# Patient Record
Sex: MALE
[De-identification: names, ages, dates, MRNs, and addresses within clinical notes are randomized per-mention and may not be internally consistent; named-entity substitution may affect disease eponyms.]

---

## 2019-11-20 ENCOUNTER — HOSPITAL ENCOUNTER (EMERGENCY)
Dept: HOSPITAL 5 - ED | Age: 44
Discharge: HOME | End: 2019-11-20
Payer: COMMERCIAL

## 2019-11-20 VITALS — DIASTOLIC BLOOD PRESSURE: 85 MMHG | SYSTOLIC BLOOD PRESSURE: 134 MMHG
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
## 2019-11-20 DIAGNOSIS — S06.0X0A: Primary | ICD-10-CM
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
## 2019-11-20 PROCEDURE — 70450 CT HEAD/BRAIN W/O DYE: CPT

## 2019-11-20 NOTE — EMERGENCY DEPARTMENT REPORT
ED General Adult HPI





- General


Chief complaint: Head Injury


Stated complaint: HEAD INJURY


Time Seen by Provider: 11/20/19 18:45


Source: patient, RN notes reviewed


Mode of arrival: Ambulatory


Limitations: No Limitations





- History of Present Illness


Initial comments: 





This is a pleasant 44-year-old gentleman.  This patient is not known to this 

provider previously.  The patient lives in [place].  He does not have a local 

primary care doctor.  The patient was in his usual state of health 3 days ago, 

when he slipped and had a mechanical fall, mostly landing on the superior aspect

of his back, but also hitting his head.  Prior to the fall, he was not having 

any medical complaints or symptoms.  After the fall, he developed a mild 

headache.  He reports that since then, he is felt unwell, mild headache, and 

dizzy.  He is not lost consciousness.  He denies vomiting, diaphoresis.  He 

denies midline neck pain.  He denies chest pain and abdominal pain and shortness

of breath.  He describes his dizziness as a sensation of lightheadedness.  There

is no sensation of heart racing and/or palpitations.  He makes no complaints or 

admission of DVT and/or pulmonary embolism risk factors.


-: Sudden


Location: head


Quality: aching


Consistency: intermittent


Improves with: none


Worsens with: none





- Related Data


                                    Allergies











Allergy/AdvReac Type Severity Reaction Status Date / Time


 


No Known Allergies Allergy   Unverified 11/20/19 17:01














ED Review of Systems


ROS: 


Stated complaint: HEAD INJURY


Other details as noted in HPI





Constitutional: denies: fever


Eyes: denies: eye discharge


ENT: denies: congestion


Cardiovascular: denies: syncope


Gastrointestinal: denies: abdominal pain, vomiting


Musculoskeletal: myalgia


Neurological: weakness.  denies: abnormal gait, vertigo


Hematological/Lymphatic: denies: easy bleeding





ED Past Medical Hx





- Past Medical History


Previous Medical History?: No





- Surgical History


Past Surgical History?: No





ED Physical Exam





- General


Limitations: No Limitations


General appearance: alert, in no apparent distress





- Head


Head exam: Present: atraumatic, normocephalic





- Eye


Eye exam: Present: normal appearance, PERRL, EOMI, other (visual acuity intact 

to finger counting, color perception, reading at a close distance).  Absent: 

nystagmus





- ENT


ENT exam: Present: normal exam, normal orophraynx, mucous membranes moist, 

normal external ear exam (visual acuity intact to finger counting, color 

perception, reading at a close distance)





- Neck


Neck exam: Present: normal inspection, full ROM.  Absent: tenderness, 

meningismus





- Respiratory


Respiratory exam: Present: normal lung sounds bilaterally.  Absent: respiratory 

distress





- Cardiovascular


Cardiovascular Exam: Present: regular rate, normal rhythm, normal heart sounds. 

Absent: bradycardia, tachycardia, irregular rhythm, systolic murmur, diastolic 

murmur, rubs, gallop





- GI/Abdominal


GI/Abdominal exam: Present: soft, normal bowel sounds.  Absent: distended, 

tenderness, guarding, rebound, rigid, pulsatile mass





- Rectal


Rectal exam: Present: deferred





- Extremities Exam


Extremities exam: Present: normal inspection, full ROM, other (2+ pulses noted 

in the bilateral upper, lower extremities.  There is no long bone tenderness.  

Musculoskeletal compartments are soft.  The pelvis is stable.).  Absent: pedal 

edema, joint swelling, calf tenderness





- Back Exam


Back exam: Present: normal inspection, full ROM.  Absent: tenderness, CVA 

tenderness (R), CVA tenderness (L), paraspinal tenderness, vertebral tenderness





- Neurological Exam


Neurological exam: Present: alert (there is no past-pointing.  There is normal 

heel-to-shin.  There is no pronator drift.  There is normal gait.  There is 

normal tandem gait.  There is negative Romberg examination.), oriented X3 (the 

patient is able to add, multiply, recall 3/3 words at 0, and 5 minutes), normal

gait, other (there is no facial droop.  The tongue is midline.  Extraocular 

movements are intact bilaterally.  Patient speaking in full complete sentences. 

Shoulder shrug is intact bilaterally.  Hearing is grossly intact bilaterally.  

Visual acuity intact to finger counting and color perception at a close 

distance.  5/5 strength 4 extremities.  Sensation intact to light touch in 4 

extremities.).  Absent: motor sensory deficit





- Psychiatric


Psychiatric exam: Present: normal affect, normal mood





- Skin


Skin exam: Present: warm, dry, intact, normal color.  Absent: rash





ED Course


                                   Vital Signs











  11/20/19





  18:54


 


Temperature 98.4 F


 


Pulse Rate 74


 


Respiratory 18





Rate 


 


Blood Pressure 134/85


 


O2 Sat by Pulse 98





Oximetry 














ED Medical Decision Making





- Lab Data








                                   Vital Signs











  11/20/19





  18:54


 


Temperature 98.4 F


 


Pulse Rate 74


 


Respiratory 18





Rate 


 


Blood Pressure 134/85


 


O2 Sat by Pulse 98





Oximetry 














- Radiology Data


Radiology results: report reviewed, image reviewed





Noncontrast CT scan of the brain is negative for acute disease





- Medical Decision Making





Differential diagnosis, including but not limited to: Concussion, mild 

intracranial injury





Assessment and plan: 44-year-old gentleman who is approximately 3 days status 

post mild blunt head injury.  He is afebrile with reassuring vital signs and 

clinically sober.  He walks with a steady gait.  GCS of 15, NIH score of 0.

Patient is clinically sober at this time.  The cervical spine is cleared through

nexus and Guyanese c spine rule











Patient most likely experiencing natural history of concussion.  We discussed 

expectant management for concussion.  CT scan brain negative for acute disease. 

He appears quite comfortable at this time, he is medically suitable for 

discharge with an outpatient physician.


Critical care attestation.: 


If time is entered above; I have spent that time in minutes in the direct care 

of this critically ill patient, excluding procedure time.








ED Disposition


Clinical Impression: 


Concussion


Qualifiers:


 Encounter type: initial encounter Loss of consciousness presence/duration: 

without LOC Qualified Code(s): S06.0X0A - Concussion without loss of 

consciousness, initial encounter





Disposition: DC-01 TO HOME OR SELFCARE


Is pt being admited?: No


Does the pt Need Aspirin: No


Condition: Stable


Instructions:  Minor Head Injury (ED)


Additional Instructions: 


The patient does not have an immediate medical prohibition to returning to work 

at this time.  Patient likely has a concussion, and may experience sensitivity 

to light, sensitivity to sounds.  The patient may return to light duty, however,

he should not participate in strenuous physical activity and or contact sports 

or athletics until cleared to do so by a primary care doctor.  Patient may 

alternate over-the-counter Motrin and Tylenol as needed for pain.  Advance diet 

as tolerated.  Patient may benefit from caffeinated beverages.  Follow-up with 

the primary care doctor within the next 2 weeks.  Patient may go to sleep 

without sleep restriction.  Return to emergency room right away with new, worsen

ed, different symptoms, or symptoms not present on initial emergency room 

evaluation.


Referrals: 


[facility] [Provider Group] - as needed

## 2019-11-20 NOTE — EVENT NOTE
ED Screening Note


Date of service: 11/20/19 (n)


Time: 17:16


ED Screening Note: 


43 yo male presents s/p head injury 4 days ago hit his head on the wall,


today blurtry vision, pain and dizziness





This initial assessment/diagnostic orders/clinical plan/treatment(s) is/are 

subject to change based on patients health status, clinical progression and re-

assessment by fellow clinical providers in the ED. Further treatment and workup 

at subsequent clinical providers discretion. Patient/guardian urged not to elope

from the ED as their condition may be serious if not clinically assessed and 

managed. 





Initial orders include: 


CT scan

## 2019-11-20 NOTE — CAT SCAN REPORT
CT head/brain wo con



INDICATION / CLINICAL INFORMATION:

44 years Male; head injury/fall. 



TECHNIQUE: Routine CT head without contrast. All CT scans at this location are performed using CT dos
e reduction for ALARA by means of automated exposure control. 



COMPARISON: 

None.



FINDINGS:



BRAIN / INTRACRANIAL CONTENTS: The brain demonstrates appropriate attenuation. The ventricular system
 is within normal limits in size and configuration. There is no CT evidence of acute intracranial hem
orrhage or significant mass effect.



ORBITS: No significant abnormality of visualized orbits.

SINUSES / MASTOIDS: There is mild mucosal thickening within the visualized ethmoid air cells.



CRANIOCERVICAL JUNCTION: No significant abnormality.

ADDITIONAL FINDINGS: None. 



IMPRESSION:

1. There is no CT evidence of acute intracranial process. 



Signer Name: Danny Guillory MD 

Signed: 11/20/2019 6:10 PM

 Workstation Name: [id number]